# Patient Record
Sex: MALE | URBAN - METROPOLITAN AREA
[De-identification: names, ages, dates, MRNs, and addresses within clinical notes are randomized per-mention and may not be internally consistent; named-entity substitution may affect disease eponyms.]

---

## 2022-02-01 ENCOUNTER — OFFICE VISIT (OUTPATIENT)
Dept: URBAN - METROPOLITAN AREA CLINIC 96 | Facility: CLINIC | Age: 82
End: 2022-02-01
Payer: MEDICARE

## 2022-02-01 VITALS
SYSTOLIC BLOOD PRESSURE: 172 MMHG | BODY MASS INDEX: 22.34 KG/M2 | WEIGHT: 139 LBS | TEMPERATURE: 98.4 F | HEIGHT: 66 IN | HEART RATE: 78 BPM | DIASTOLIC BLOOD PRESSURE: 113 MMHG

## 2022-02-01 DIAGNOSIS — R14.0 BLOATING: ICD-10-CM

## 2022-02-01 DIAGNOSIS — R10.13 EPIGASTRIC PAIN: ICD-10-CM

## 2022-02-01 DIAGNOSIS — R68.81 EARLY SATIETY: ICD-10-CM

## 2022-02-01 DIAGNOSIS — Z85.118 PERSONAL HISTORY OF LUNG CANCER: ICD-10-CM

## 2022-02-01 PROCEDURE — 99204 OFFICE O/P NEW MOD 45 MIN: CPT | Performed by: INTERNAL MEDICINE

## 2022-02-01 NOTE — PHYSICAL EXAM GASTROINTESTINAL
Abdomen , scars, soft, tender in the epigastric area,  nondistended , no guarding or rigidity , no masses palpable , normal bowel sounds , Liver and Spleen , no hepatomegaly present , no hepatosplenomegaly , liver nontender , spleen not palpable

## 2022-02-01 NOTE — HPI-TODAY'S VISIT:
Patient being seen in consultation as requested by Dr. Arnel Roldan for epigastic abdominal pain.  A copy of this document vin be sent to the physician.  Patient with hx of lung cancer 3 years ago s/p radiation therapy for such. Patient reports 6 weeks ago started having belching, no prior such sx, sudden onset with epigastric pain and pressure. No nausea or vomiting, no dysphagia or odynophagia. Started having looser stools as well. No melena, no rectal bleeding. No unintentional weight loss. No hx of PUD. Was taking 1/2 daily ASA for 20 years, no longer taking since onset of sx. No NSAIDs. No alcohol. No f/c, no cough. No prior PUD hx.   No prior EGD. Colonoscopy 2015 normal per patient.  No clear post prandial exacerbation.   Occasionally will awaken from sleep and will take milk which helps minimally. Only eats one meal daily, perhaps sx onset hours after eating. No med changes, no diet changes. No sick exposure. Questions if related to eating blue cheese months ago.  Denies any GERD sx, does admit to bloating and early satiety after eating. No food allergies or diet restrictions.  PET scan 12/27/2021 with diverticulosis, normal liver and gallbaldder, (see chart for reviewed records)

## 2022-02-01 NOTE — PHYSICAL EXAM CONSTITUTIONAL:
well developed, well nourished , in no acute distress , ambulating without difficulty , normal communication ability, [place]

## 2022-02-22 ENCOUNTER — OFFICE VISIT (OUTPATIENT)
Dept: URBAN - METROPOLITAN AREA MEDICAL CENTER 28 | Facility: MEDICAL CENTER | Age: 82
End: 2022-02-22

## 2022-02-22 ENCOUNTER — CLAIMS CREATED FROM THE CLAIM WINDOW (OUTPATIENT)
Dept: URBAN - METROPOLITAN AREA MEDICAL CENTER 28 | Facility: MEDICAL CENTER | Age: 82
End: 2022-02-22
Payer: MEDICARE

## 2022-02-22 ENCOUNTER — TELEPHONE ENCOUNTER (OUTPATIENT)
Dept: URBAN - METROPOLITAN AREA CLINIC 92 | Facility: CLINIC | Age: 82
End: 2022-02-22

## 2022-02-22 DIAGNOSIS — K31.89 ACQUIRED DEFORMITY OF DUODENUM: ICD-10-CM

## 2022-02-22 DIAGNOSIS — K29.60 ADENOPAPILLOMATOSIS GASTRICA: ICD-10-CM

## 2022-02-22 DIAGNOSIS — R10.13 ABDOMINAL DISCOMFORT, EPIGASTRIC: ICD-10-CM

## 2022-02-22 DIAGNOSIS — K22.70 BARRETT ESOPHAGUS: ICD-10-CM

## 2022-02-22 PROCEDURE — 43239 EGD BIOPSY SINGLE/MULTIPLE: CPT | Performed by: INTERNAL MEDICINE

## 2022-02-22 RX ORDER — OMEPRAZOLE 20 MG/1
1 CAPSULE 30 MINUTES BEFORE MORNING MEAL CAPSULE, DELAYED RELEASE ORAL ONCE A DAY
Qty: 90 | Refills: 1 | OUTPATIENT
Start: 2022-02-22

## 2022-03-03 ENCOUNTER — TELEPHONE ENCOUNTER (OUTPATIENT)
Dept: URBAN - METROPOLITAN AREA CLINIC 96 | Facility: CLINIC | Age: 82
End: 2022-03-03

## 2022-04-11 PROBLEM — 196609006: Status: ACTIVE | Noted: 2022-04-11

## 2022-04-12 ENCOUNTER — OFFICE VISIT (OUTPATIENT)
Dept: URBAN - METROPOLITAN AREA CLINIC 96 | Facility: CLINIC | Age: 82
End: 2022-04-12
Payer: MEDICARE

## 2022-04-12 ENCOUNTER — DASHBOARD ENCOUNTERS (OUTPATIENT)
Age: 82
End: 2022-04-12

## 2022-04-12 DIAGNOSIS — K22.70 BARRETT'S ESOPHAGUS WITHOUT DYSPLASIA: ICD-10-CM

## 2022-04-12 DIAGNOSIS — R14.0 BLOATING: ICD-10-CM

## 2022-04-12 DIAGNOSIS — R68.81 EARLY SATIETY: ICD-10-CM

## 2022-04-12 DIAGNOSIS — R10.13 EPIGASTRIC PAIN: ICD-10-CM

## 2022-04-12 PROBLEM — 162031009: Status: ACTIVE | Noted: 2022-02-02

## 2022-04-12 PROBLEM — 116289008: Status: ACTIVE | Noted: 2022-02-02

## 2022-04-12 PROBLEM — 79922009: Status: ACTIVE | Noted: 2022-02-02

## 2022-04-12 PROBLEM — 428878000: Status: ACTIVE | Noted: 2022-02-01

## 2022-04-12 PROBLEM — 442076002: Status: ACTIVE | Noted: 2022-02-02

## 2022-04-12 PROBLEM — 302914006: Status: ACTIVE | Noted: 2022-04-11

## 2022-04-12 PROCEDURE — 99214 OFFICE O/P EST MOD 30 MIN: CPT | Performed by: INTERNAL MEDICINE

## 2022-04-12 RX ORDER — OMEPRAZOLE 20 MG/1
1 CAPSULE 30 MINUTES BEFORE MORNING MEAL CAPSULE, DELAYED RELEASE ORAL ONCE A DAY
Qty: 90 | Refills: 1 | Status: ACTIVE | COMMUNITY
Start: 2022-02-22

## 2022-04-12 NOTE — PHYSICAL EXAM CONSTITUTIONAL:
well developed, well nourished , in no acute distress , ambulating without difficulty , normal communication ability, [place]

## 2022-04-12 NOTE — HPI-TODAY'S VISIT:
Patient seen 2/1/2022 for epigastic abdominal pain.    As noted prior, patient with hx of lung cancer 3 years ago s/p radiation therapy for such. Patient reports 6 weeks ago started having belching, no prior such sx, sudden onset with epigastric pain and pressure. No nausea or vomiting, no dysphagia or odynophagia. Started having looser stools as well. No melena, no rectal bleeding. No unintentional weight loss. No hx of PUD. Was taking 1/2 daily ASA for 20 years, no longer taking since onset of sx. No NSAIDs. No alcohol. No f/c, no cough. No prior PUD hx.   Colonoscopy 2015 normal per patient.  No clear post prandial exacerbation.   Occasionally will awaken from sleep and will take milk which helps minimally. Only eats one meal daily, perhaps sx onset hours after eating.   Denies any GERD sx, does admit to bloating and early satiety after eating. No food allergies or diet restrictions.  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 Advised Prilosec 20 mg daily post EGD. He reports epigastric pain now resolved with resolution of prior belching. Has been doing well. Resolution of prior GI sx.

## 2022-04-30 ENCOUNTER — TELEPHONE ENCOUNTER (OUTPATIENT)
Dept: URBAN - METROPOLITAN AREA CLINIC 121 | Facility: CLINIC | Age: 82
End: 2022-04-30

## 2022-05-01 ENCOUNTER — TELEPHONE ENCOUNTER (OUTPATIENT)
Dept: URBAN - METROPOLITAN AREA CLINIC 121 | Facility: CLINIC | Age: 82
End: 2022-05-01

## 2022-05-01 RX ORDER — ASPIRIN 325 MG/1
1/2 PO QD TABLET ORAL
Status: ACTIVE | COMMUNITY
Start: 2015-04-06

## 2022-05-01 RX ORDER — CYANOCOBALAMIN (VITAMIN B-12) 500 MCG
QD LOZENGE ORAL
Status: ACTIVE | COMMUNITY
Start: 2015-04-06

## 2022-05-01 RX ORDER — ROSUVASTATIN CALCIUM 10 MG
TABLET ORAL
Status: ACTIVE | COMMUNITY
Start: 2015-04-06

## 2022-05-01 RX ORDER — IBUPROFEN 200 MG
BID CAPSULE ORAL
Status: ACTIVE | COMMUNITY
Start: 2015-04-06

## 2022-05-01 RX ORDER — ASCORBIC ACID 500 MG
TABLET ORAL
Status: ACTIVE | COMMUNITY
Start: 2015-04-06

## 2022-05-01 RX ORDER — FLECAINIDE ACETATE 100 MG/1
BID TABLET ORAL
Status: ACTIVE | COMMUNITY
Start: 2015-04-06